# Patient Record
Sex: MALE | Race: WHITE | NOT HISPANIC OR LATINO | Employment: UNEMPLOYED | URBAN - METROPOLITAN AREA
[De-identification: names, ages, dates, MRNs, and addresses within clinical notes are randomized per-mention and may not be internally consistent; named-entity substitution may affect disease eponyms.]

---

## 2018-01-31 ENCOUNTER — HOSPITAL ENCOUNTER (EMERGENCY)
Facility: HOSPITAL | Age: 48
Discharge: HOME | End: 2018-02-01
Attending: EMERGENCY MEDICINE
Payer: OTHER GOVERNMENT

## 2018-01-31 DIAGNOSIS — R50.9 FEVER: ICD-10-CM

## 2018-01-31 LAB
ALBUMIN SERPL BCP-MCNC: 2.8 G/DL
ALP SERPL-CCNC: 125 U/L
ALT SERPL W/O P-5'-P-CCNC: 26 U/L
ANION GAP SERPL CALC-SCNC: 10 MMOL/L
APTT BLDCRRT: 30.4 SEC
AST SERPL-CCNC: 37 U/L
BASOPHILS # BLD AUTO: 0.01 K/UL
BASOPHILS NFR BLD: 0.1 %
BILIRUB SERPL-MCNC: 0.5 MG/DL
BILIRUB UR QL STRIP: NEGATIVE
BUN SERPL-MCNC: 9 MG/DL
CALCIUM SERPL-MCNC: 9 MG/DL
CHLORIDE SERPL-SCNC: 97 MMOL/L
CLARITY UR: CLEAR
CO2 SERPL-SCNC: 28 MMOL/L
COLOR UR: YELLOW
CREAT SERPL-MCNC: 0.8 MG/DL
DIFFERENTIAL METHOD: ABNORMAL
EOSINOPHIL # BLD AUTO: 0 K/UL
EOSINOPHIL NFR BLD: 0.3 %
ERYTHROCYTE [DISTWIDTH] IN BLOOD BY AUTOMATED COUNT: 13.2 %
EST. GFR  (AFRICAN AMERICAN): >60 ML/MIN/1.73 M^2
EST. GFR  (NON AFRICAN AMERICAN): >60 ML/MIN/1.73 M^2
FLUAV AG SPEC QL IA: NEGATIVE
FLUBV AG SPEC QL IA: NEGATIVE
GLUCOSE SERPL-MCNC: 129 MG/DL
GLUCOSE UR QL STRIP: NEGATIVE
HCT VFR BLD AUTO: 34.9 %
HGB BLD-MCNC: 11.7 G/DL
HGB UR QL STRIP: NEGATIVE
INR PPP: 1.3
KETONES UR QL STRIP: NEGATIVE
LACTATE SERPL-SCNC: 1.6 MMOL/L
LEUKOCYTE ESTERASE UR QL STRIP: NEGATIVE
LYMPHOCYTES # BLD AUTO: 0.6 K/UL
LYMPHOCYTES NFR BLD: 8.1 %
MAGNESIUM SERPL-MCNC: 1.7 MG/DL
MCH RBC QN AUTO: 28.1 PG
MCHC RBC AUTO-ENTMCNC: 33.5 G/DL
MCV RBC AUTO: 84 FL
MONOCYTES # BLD AUTO: 0.4 K/UL
MONOCYTES NFR BLD: 5.2 %
NEUTROPHILS # BLD AUTO: 6.3 K/UL
NEUTROPHILS NFR BLD: 86.3 %
NITRITE UR QL STRIP: NEGATIVE
PH UR STRIP: 6 [PH] (ref 5–8)
PHOSPHATE SERPL-MCNC: 2.9 MG/DL
PLATELET # BLD AUTO: 328 K/UL
PMV BLD AUTO: 9.6 FL
POTASSIUM SERPL-SCNC: 4.1 MMOL/L
PROT SERPL-MCNC: 7 G/DL
PROT UR QL STRIP: NEGATIVE
PROTHROMBIN TIME: 13.3 SEC
RBC # BLD AUTO: 4.17 M/UL
SODIUM SERPL-SCNC: 135 MMOL/L
SP GR UR STRIP: 1.02 (ref 1–1.03)
SPECIMEN SOURCE: NORMAL
URN SPEC COLLECT METH UR: ABNORMAL
UROBILINOGEN UR STRIP-ACNC: ABNORMAL EU/DL
WBC # BLD AUTO: 7.3 K/UL

## 2018-01-31 PROCEDURE — 85610 PROTHROMBIN TIME: CPT

## 2018-01-31 PROCEDURE — 80053 COMPREHEN METABOLIC PANEL: CPT

## 2018-01-31 PROCEDURE — 96360 HYDRATION IV INFUSION INIT: CPT

## 2018-01-31 PROCEDURE — 83605 ASSAY OF LACTIC ACID: CPT

## 2018-01-31 PROCEDURE — 85730 THROMBOPLASTIN TIME PARTIAL: CPT

## 2018-01-31 PROCEDURE — 85025 COMPLETE CBC W/AUTO DIFF WBC: CPT

## 2018-01-31 PROCEDURE — 87040 BLOOD CULTURE FOR BACTERIA: CPT

## 2018-01-31 PROCEDURE — 84100 ASSAY OF PHOSPHORUS: CPT

## 2018-01-31 PROCEDURE — 83735 ASSAY OF MAGNESIUM: CPT

## 2018-01-31 PROCEDURE — 99283 EMERGENCY DEPT VISIT LOW MDM: CPT | Mod: 25

## 2018-01-31 PROCEDURE — 87400 INFLUENZA A/B EACH AG IA: CPT | Mod: 59

## 2018-01-31 PROCEDURE — 81003 URINALYSIS AUTO W/O SCOPE: CPT

## 2018-01-31 PROCEDURE — 25000003 PHARM REV CODE 250: Performed by: EMERGENCY MEDICINE

## 2018-01-31 RX ORDER — MORPHINE SULFATE ORAL SOLUTION 10 MG/5ML
5 SOLUTION ORAL
COMMUNITY

## 2018-01-31 RX ORDER — IBUPROFEN 400 MG/1
400 TABLET ORAL
Status: COMPLETED | OUTPATIENT
Start: 2018-02-01 | End: 2018-02-01

## 2018-01-31 RX ADMIN — SODIUM CHLORIDE 1000 ML: 0.9 INJECTION, SOLUTION INTRAVENOUS at 10:01

## 2018-02-01 VITALS
DIASTOLIC BLOOD PRESSURE: 75 MMHG | HEIGHT: 74 IN | SYSTOLIC BLOOD PRESSURE: 132 MMHG | RESPIRATION RATE: 22 BRPM | BODY MASS INDEX: 32.08 KG/M2 | HEART RATE: 112 BPM | OXYGEN SATURATION: 96 % | TEMPERATURE: 100 F | WEIGHT: 250 LBS

## 2018-02-01 PROCEDURE — 25000003 PHARM REV CODE 250: Performed by: EMERGENCY MEDICINE

## 2018-02-01 RX ADMIN — IBUPROFEN 400 MG: 400 TABLET, FILM COATED ORAL at 12:02

## 2018-02-01 NOTE — ED NOTES
Patient identifies self as Kevin Blair Jr.      LOC: The patient is awake, alert and aware of environment with an appropriate affect, the patient is oriented x 3 and speaking appropriately.  APPEARANCE: Patient resting comfortably and in no acute distress, patient is clean and well groomed, patient's clothing is properly fastened.  SKIN: The skin is warm and dry, color consistent with ethnicity, patient has normal skin turgor and moist mucus membranes, skin intact, no breakdown or bruising noted.  MUSCULOSKELETAL: Patient moving all extremities well, no obvious swelling or deformities noted.  RESPIRATORY: Airway is open and patent, respirations are spontaneous, patient has a normal effort and rate, no accessory muscle use noted.  CARDIAC: Patient has a normal rate and rhythm, no periphreal edema noted, capillary refill < 3 seconds.  ABDOMEN: Soft and non tender to palpation, no distention noted.  NEUROLOGIC: PERRL, 4mm bilaterally, eyes open spontaneously, behavior appropriate to situation, follows commands, facial expression symmetrical, bilateral hand grasp equal and even, purposeful motor response noted, normal sensation in all extremities when touched with a finger    Patient c/o fever at home.

## 2018-02-01 NOTE — ED PROVIDER NOTES
SCRIBE #1 NOTE: I, Cookie Gibbs, am scribing for, and in the presence of, Anabel Coffey MD. I have scribed the entire note.      History      Chief Complaint   Patient presents with    Fever Post Chemo     pt states he is currently receiving chemo for colon cancer with liver mets; pt c/o severe R side abd pain and fever that started tonight       Review of patient's allergies indicates:  No Known Allergies     HPI   HPI    1/31/2018, 10:07 PM   History obtained from the patient      History of Present Illness: Kevin Blair Jr. is a 47 y.o. male patient with a PMHx of Colon CA with Liver Mets who presents to the Emergency Department for fever (Tnow 101.5) which onset gradually today. Symptoms are constant and moderate in severity. No mitigating or exacerbating factors reported. Associated sxs include chronic R sided abdominal pain. Pt last received chemotherapy on 1/23. Pt reports this is his first round of chemo. Patient denies any sore throat, congestion, rhinorrhea, otalgia, n/v/d, hematochezia, constipation, cough, SOB, CP, urinary sxs, HA, neck pain/stiffness, rash, and all other sxs at this time. No further complaints or concerns at this time.       Arrival mode: Personal vehicle     PCP: Berkley Banuelos MD       Past Medical History:  Past Medical History:   Diagnosis Date    Brain tumor     Colon cancer     Liver cancer        Past Surgical History:  Past Surgical History:   Procedure Laterality Date    APPENDECTOMY      COLECTOMY           Family History:  History reviewed. No pertinent family history.    Social History:  Social History     Social History Main Topics    Smoking status: uknown    Smokeless tobacco: unknown    Alcohol use unknown    Drug use: Unknown    Sexual activity: unknown       ROS   Review of Systems   Constitutional: Positive for fever (Tnow 101.5).   HENT: Negative for congestion, ear pain, rhinorrhea and sore throat.    Respiratory: Negative for cough and shortness of  breath.    Cardiovascular: Negative for chest pain.   Gastrointestinal: Positive for abdominal pain (chronic R side). Negative for blood in stool, constipation, diarrhea, nausea and vomiting.   Genitourinary: Negative for dysuria, frequency and hematuria.   Musculoskeletal: Negative for back pain, neck pain and neck stiffness.   Skin: Negative for rash.   Neurological: Negative for weakness and headaches.   Hematological: Does not bruise/bleed easily.   All other systems reviewed and are negative.    Physical Exam      Initial Vitals [01/31/18 2152]   BP Pulse Resp Temp SpO2   (!) 143/70 (!) 133 (!) 24 (!) 101.5 °F (38.6 °C) (!) 94 %      MAP       94.33          Physical Exam  Nursing Notes and Vital Signs Reviewed.  Constitutional: Patient is in no acute distress. Well-developed and well-nourished.  Head: Atraumatic. Normocephalic.  Eyes: PERRL. EOM intact. Conjunctivae are not pale. No scleral icterus.  ENT: Mucous membranes are moist. Oropharynx is clear and symmetric.    Neck: Supple. Full ROM. No lymphadenopathy.  Cardiovascular: Tachycardic. Regular rhythm. No murmurs, rubs, or gallops. Distal pulses are 2+ and symmetric.  Pulmonary/Chest: No respiratory distress. Clear to auscultation bilaterally. No wheezing or rales.  Abdominal: Soft and non-distended.  There is right-sided tenderness.  No rebound, guarding, or rigidity. Good bowel sounds.  Musculoskeletal: Moves all extremities. No obvious deformities. No edema. No calf tenderness.  Skin: Warm and dry.  Neurological:  Alert, awake, and appropriate.  Normal speech.  No acute focal neurological deficits are appreciated.  Psychiatric: Normal affect. Good eye contact. Appropriate in content.    ED Course    Procedures  ED Vital Signs:  Vitals:    01/31/18 2152 02/01/18 0029   BP: (!) 143/70 132/75   Pulse: (!) 133 (!) 112   Resp: (!) 24 (!) 22   Temp: (!) 101.5 °F (38.6 °C) 99.9 °F (37.7 °C)   TempSrc: Oral Oral   SpO2: (!) 94% 96%   Weight: 113.4 kg (250 lb)  "   Height: 6' 2" (1.88 m)        Abnormal Lab Results:  Labs Reviewed   CBC W/ AUTO DIFFERENTIAL - Abnormal; Notable for the following:        Result Value    RBC 4.17 (*)     Hemoglobin 11.7 (*)     Hematocrit 34.9 (*)     Lymph # 0.6 (*)     Gran% 86.3 (*)     Lymph% 8.1 (*)     All other components within normal limits   COMPREHENSIVE METABOLIC PANEL - Abnormal; Notable for the following:     Sodium 135 (*)     Glucose 129 (*)     Albumin 2.8 (*)     All other components within normal limits   PROTIME-INR - Abnormal; Notable for the following:     Prothrombin Time 13.3 (*)     INR 1.3 (*)     All other components within normal limits   URINALYSIS - Abnormal; Notable for the following:     Urobilinogen, UA 2.0-3.0 (*)     All other components within normal limits   CULTURE, BLOOD   CULTURE, BLOOD   APTT   LACTIC ACID, PLASMA   PHOSPHORUS   MAGNESIUM   INFLUENZA A AND B ANTIGEN        All Lab Results:  Results for orders placed or performed during the hospital encounter of 01/31/18   CBC auto differential   Result Value Ref Range    WBC 7.30 3.90 - 12.70 K/uL    RBC 4.17 (L) 4.60 - 6.20 M/uL    Hemoglobin 11.7 (L) 14.0 - 18.0 g/dL    Hematocrit 34.9 (L) 40.0 - 54.0 %    MCV 84 82 - 98 fL    MCH 28.1 27.0 - 31.0 pg    MCHC 33.5 32.0 - 36.0 g/dL    RDW 13.2 11.5 - 14.5 %    Platelets 328 150 - 350 K/uL    MPV 9.6 9.2 - 12.9 fL    Gran # (ANC) 6.3 1.8 - 7.7 K/uL    Lymph # 0.6 (L) 1.0 - 4.8 K/uL    Mono # 0.4 0.3 - 1.0 K/uL    Eos # 0.0 0.0 - 0.5 K/uL    Baso # 0.01 0.00 - 0.20 K/uL    Gran% 86.3 (H) 38.0 - 73.0 %    Lymph% 8.1 (L) 18.0 - 48.0 %    Mono% 5.2 4.0 - 15.0 %    Eosinophil% 0.3 0.0 - 8.0 %    Basophil% 0.1 0.0 - 1.9 %    Differential Method Automated    Comprehensive metabolic panel   Result Value Ref Range    Sodium 135 (L) 136 - 145 mmol/L    Potassium 4.1 3.5 - 5.1 mmol/L    Chloride 97 95 - 110 mmol/L    CO2 28 23 - 29 mmol/L    Glucose 129 (H) 70 - 110 mg/dL    BUN, Bld 9 6 - 20 mg/dL    Creatinine " 0.8 0.5 - 1.4 mg/dL    Calcium 9.0 8.7 - 10.5 mg/dL    Total Protein 7.0 6.0 - 8.4 g/dL    Albumin 2.8 (L) 3.5 - 5.2 g/dL    Total Bilirubin 0.5 0.1 - 1.0 mg/dL    Alkaline Phosphatase 125 55 - 135 U/L    AST 37 10 - 40 U/L    ALT 26 10 - 44 U/L    Anion Gap 10 8 - 16 mmol/L    eGFR if African American >60 >60 mL/min/1.73 m^2    eGFR if non African American >60 >60 mL/min/1.73 m^2   Protime-INR   Result Value Ref Range    Prothrombin Time 13.3 (H) 9.0 - 12.5 sec    INR 1.3 (H) 0.8 - 1.2   APTT   Result Value Ref Range    aPTT 30.4 21.0 - 32.0 sec   Lactic acid, plasma   Result Value Ref Range    Lactate (Lactic Acid) 1.6 0.5 - 2.2 mmol/L   Phosphorus   Result Value Ref Range    Phosphorus 2.9 2.7 - 4.5 mg/dL   Magnesium   Result Value Ref Range    Magnesium 1.7 1.6 - 2.6 mg/dL   Urinalysis   Result Value Ref Range    Specimen UA Urine, Clean Catch     Color, UA Yellow Yellow, Straw, [place]    Appearance, UA Clear Clear    pH, UA 6.0 5.0 - 8.0    Specific Gravity, UA 1.020 1.005 - 1.030    Protein, UA Negative Negative    Glucose, UA Negative Negative    Ketones, UA Negative Negative    Bilirubin (UA) Negative Negative    Occult Blood UA Negative Negative    Nitrite, UA Negative Negative    Urobilinogen, UA 2.0-3.0 (A) <2.0 EU/dL    Leukocytes, UA Negative Negative   Influenza antigen Nasopharyngeal Swab   Result Value Ref Range    Influenza A Ag, EIA Negative Negative    Influenza B Ag, EIA Negative Negative    Flu A & B Source Nasopharyngeal Swab        Imaging Results:  Imaging Results          X-Ray Chest 1 View (Final result)  Result time 01/31/18 22:42:59    Final result by Elizabeth Barber MD (01/31/18 22:42:59)                 Impression:      No definite acute process.      Electronically signed by: ELIZABETH BARBER MD  Date:     01/31/18  Time:    22:42              Narrative:    Exam: XR CHEST 1 VIEW,    Date:  01/31/18 22:36:04    History: Fever    Comparison:  No prior relevant studies  available    Findings: Left chest wall Mediport catheter is present.    Heart size is normal.    There is nonspecific blunting of the left costophrenic angle.    Lung findings are slightly reduced.  Mild thoracic scoliosis.                                      The Emergency Provider reviewed the vital signs and test results, which are outlined above.    ED Discussion     12:08 AM: Reassessed pt at this time. Discussed with pt all pertinent ED information and results. Discussed pt dx and plan of tx. Gave pt all f/u and return to the ED instructions. All questions and concerns were addressed at this time. Pt expresses understanding of information and instructions, and is comfortable with plan to discharge. Pt is stable for discharge.      ED Medication(s):  Medications   sodium chloride 0.9% bolus 1,000 mL (0 mLs Intravenous Stopped 1/31/18 8045)   ibuprofen tablet 400 mg (400 mg Oral Given 2/1/18 0010)       Discharge Medication List as of 2/1/2018 12:06 AM          Follow-up Information     Berkley Banuelos MD In 2 days.    Specialty:  Internal Medicine  Contact information:  [address] [facility] [id number] [phone]             [facility] - .    Specialty:  Emergency Medicine  Why:  As needed, If symptoms worsen  Contact information:  [address] [facility] [address] [phone]                   Medical Decision Making    Medical Decision Making:   Clinical Tests:   Lab Tests: Ordered and Reviewed  Radiological Study: Reviewed and Ordered           Scribe Attestation:   Scribe #1: I performed the above scribed service and the documentation accurately describes the services I performed. I attest to the accuracy of the note.    Attending:   Physician Attestation Statement for Scribe #1: I, Anabel Coffey MD, personally performed the services described in this documentation, as scribed by Cookie Gibbs, in my presence, and it is both accurate and complete.           Clinical Impression       ICD-10-CM ICD-9-CM   1. Fever R50.9 780.60       Disposition:   Disposition: Discharged  Condition: Stable         Anabel Coffey MD  02/01/18 0556

## 2018-02-06 LAB
BACTERIA BLD CULT: NORMAL
BACTERIA BLD CULT: NORMAL